# Patient Record
Sex: MALE | Race: BLACK OR AFRICAN AMERICAN | Employment: FULL TIME | URBAN - METROPOLITAN AREA
[De-identification: names, ages, dates, MRNs, and addresses within clinical notes are randomized per-mention and may not be internally consistent; named-entity substitution may affect disease eponyms.]

---

## 2019-01-17 ENCOUNTER — OFFICE VISIT (OUTPATIENT)
Dept: URGENT CARE | Facility: URGENT CARE | Age: 24
End: 2019-01-17

## 2019-01-17 ENCOUNTER — ANCILLARY PROCEDURE (OUTPATIENT)
Dept: GENERAL RADIOLOGY | Facility: CLINIC | Age: 24
End: 2019-01-17

## 2019-01-17 VITALS
SYSTOLIC BLOOD PRESSURE: 139 MMHG | TEMPERATURE: 99.1 F | BODY MASS INDEX: 28.44 KG/M2 | HEIGHT: 72 IN | OXYGEN SATURATION: 100 % | DIASTOLIC BLOOD PRESSURE: 81 MMHG | WEIGHT: 210 LBS | HEART RATE: 93 BPM

## 2019-01-17 DIAGNOSIS — S00.83XA FACIAL CONTUSION, INITIAL ENCOUNTER: Primary | ICD-10-CM

## 2019-01-17 DIAGNOSIS — S09.93XA FACIAL INJURY, INITIAL ENCOUNTER: ICD-10-CM

## 2019-01-17 PROCEDURE — 99203 OFFICE O/P NEW LOW 30 MIN: CPT | Performed by: PHYSICIAN ASSISTANT

## 2019-01-17 PROCEDURE — 70140 X-RAY EXAM OF FACIAL BONES: CPT

## 2019-01-17 ASSESSMENT — ENCOUNTER SYMPTOMS
NAUSEA: 0
FEVER: 0
VOMITING: 0
BLURRED VISION: 0
SHORTNESS OF BREATH: 0
ROS SKIN COMMENTS: BRUISING TO FACE
INSOMNIA: 1
DIZZINESS: 1
HEMATURIA: 0
HEADACHES: 0
NECK PAIN: 1

## 2019-01-17 ASSESSMENT — MIFFLIN-ST. JEOR: SCORE: 1985.55

## 2019-01-17 NOTE — LETTER
January 17, 2019      Vidal Huber  [address] [place] [address]        To Whom It May Concern:    Vidal Huber  was seen on 01/17/19. He may return to work tomorrow, 1/18/2019 without restriction.        Sincerely,        Rosenda Osman PA-C

## 2019-01-18 NOTE — PATIENT INSTRUCTIONS
We will call you with the radiologist's official report on your xrays. At this time, there were no broken bones seen.    Recommend ice over the next 48 hours- cover ice pack with a cloth- do not apply ice directly to skin.  Take ibuprofen for swelling and pain.    Monitor symptoms closely. If worsening in any way or new symptoms such as blurry vision, headache, dizziness, then go to the ER immediately. Otherwise, if no improvement in 3-5 days, follow up with your primary care provider.    You may return to work as scheduled.

## 2019-01-18 NOTE — PROGRESS NOTES
"SUBJECTIVE:   Vidal Huber is a 23 year old male presenting for evaluation of   Chief Complaint   Patient presents with     Urgent Care     Facial Injury     c/o face injury done yesterday while playing soccer   .  He was playing soccer last night while playing soccer around 10pm. He took an elbow to his left cheek and side of nose. He did not seek care at the time because \"everything was closed.\" He denies LOC. He says he \"felt a little dizzy initially\" but this is resolved. He slept poorly last night due to discomfort. No headache, blurry vision, hearing changes, tinnitus. No nausea, vomiting. Left side of neck is sore but he can still move his neck. Swelling was worse when he woke up this morning.  He applied ice on the wound. He has not taken any medication. No blood thinner use. No PMH serious head injuries.     Review of Systems   Constitutional: Positive for malaise/fatigue. Negative for fever.   HENT: Positive for congestion (has a little cold going on, started prior to head injury). Negative for tinnitus.    Eyes: Negative for blurred vision.   Respiratory: Negative for shortness of breath.    Cardiovascular: Negative for chest pain.   Gastrointestinal: Negative for nausea and vomiting.   Genitourinary: Negative for hematuria.   Musculoskeletal: Positive for neck pain.   Skin:        Bruising to face   Neurological: Positive for dizziness (immediately after incident, not persistent). Negative for headaches.   Psychiatric/Behavioral: The patient has insomnia.          PMH:  Past Medical History:   Diagnosis Date     NO ACTIVE PROBLEMS      There are no active problems to display for this patient.        Current medications:  No current outpatient medications on file.       Surgical History:  Past Surgical History:   Procedure Laterality Date     no prior surgeries         Family history:  History reviewed. No pertinent family history.      Social History:  Social History     Tobacco Use     Smoking " status: Never Smoker     Smokeless tobacco: Never Used   Substance Use Topics     Alcohol use: Not on file         OBJECTIVE:  /81   Pulse 93   Temp 99.1  F (37.3  C) (Tympanic)   Ht 1.829 m (6')   Wt 95.3 kg (210 lb)   SpO2 100%   BMI 28.48 kg/m      Physical Exam  General Appearance:  Alert, cooperative, no distress, appears stated age  Skin: Warm, dry. No ecchymosis. No rashes, lesions, or lacerations.  Head: Normocephalic. There is edema and ecchymosis of the left cheek and left side of nose. There is left infra-orbital edema.   Eyes: Conjunctiva clear, lids normal. PERRL. Left infraorbital edema. Extraocular eye motions fully intact and without pain bilaterally.  Ear, Nose, Throat: Face nontraumatic, moist mucus membranes. TMs normal bilaterally. No hemotympanum.   Lungs: No distress. Equal air entry to bases bilaterally. Lungs clear to ausculation bilaterally. No wheezes, rhonchi or stridor. Chest wall nontender.    Heart: Regular rate and rhythm, no murmur, rub or gallop. Strong, equal distal pulses.  Musculoskeletal:  Back: no Cspine tenderness or crepitus. Neck AROM flexion, extension, lateral flexion, lateral rotation intact. Gait: intact, normal. Normal strength.    Neuro: Alert & oriented appropriately. Normal tone. PERRL. Normal speech, no dysarthria. CN 2 full visual fields, 3/4/6 EOMI without nystagmus, 5 Sensory intact, 7 Motor intact, face symmetric, 8 Hearing intact, 9,10 11 normal strength, 12 Tongue midline.  Motor: moves all extremities equally.   No pronator drift. Sensory: intact distally. Gait: steady gait, no antalgia.  Psychomotor slowing (-). Abnormal Movements (-).Rapid alternating Movements intact. Heel-to-shin intact bilaterally.    Psych: Normal mood and affect            Labs:  Results for orders placed or performed in visit on 01/17/19 (from the past 24 hour(s))   XR Facial Bones 1/2 Views    Narrative    XR FACIAL BONES ONE-TWO VIEWS   1/17/2019 7:00 PM     INDICATION:  "Direct facial trauma, elbow to left maxillary area,  swelling and tenderness. Facial injury, initial encounter.    COMPARISON: None.      Impression    IMPRESSION: Mild soft tissue swelling over the bridge of the nose. No  definite acute fractures though facial CT would be more sensitive if  clinically indicated.    SHADI RESTREPO MD       Xray facial bones- my interpretation- I do not appreciate any fractures.      ASSESSMENT/PLAN:      ICD-10-CM    1. Facial contusion, initial encounter S00.83XA    2. Facial injury, initial encounter S09.93XA XR Facial Bones 1/2 Views     CANCELED: XR Nasal Bones 3 Views        Medical Decision Making:    Otherwise healthy 24yo male presenting for facial trauma which occurred 18 hours ago while playing soccer. He was elbowed in the left side of the face and nose. Patient was GCS 15 here and neurologically completely intact. There was no LOC and he denies any neurologic symptoms today. I have low suspicion for intracranial hemorrhage or clinically significant TBI or concussion. I do not think that he needs an emergent head CT today.  He does have facial trauma to the left zygomatic, nasal, and infra-orbital regions. There are no signs of extraocular eye muscle entrapment on exam. His left-sided infraorbital edema is most consistent with the direct blow to this area, rather than suggestive of a \"ahmadi sign\"/basilar skull fracture.   I did obtain facial bones xray looking for signs of fracture, as I have low suspicion. If high clinical suspicion, I would have sent him to the ED for emergent facial bone CT. However, I did not think this was necessary.  Facial bones xray was negative for fracture.  Discussed xray findings with patient. Recommend at this time he continue to treat with ice, ibuprofen. I did write a note for him to return to work tomorrow.  I discussed red flag symptoms with him which would warrant immediate evaluation in the ED including headache, blurry vision, " vomiting, severe pain.    At the end of the encounter, I discussed all available results with patient. Discussed diagnosis and treatment plan.   Return precautions provided to patient and printed below in patient instructions.  Patient understood and agreed to plan. Patient was appropriate for discharge.        Patient Instructions   We will call you with the radiologist's official report on your xrays. At this time, there were no broken bones seen.    Recommend ice over the next 48 hours- cover ice pack with a cloth- do not apply ice directly to skin.  Take ibuprofen for swelling and pain.    Monitor symptoms closely. If worsening in any way or new symptoms such as blurry vision, headache, dizziness, then go to the ER immediately. Otherwise, if no improvement in 3-5 days, follow up with your primary care provider.    You may return to work as scheduled.            Rosenda Osman PA-C  01/17/19 10:21 PM

## 2022-01-11 ENCOUNTER — OFFICE VISIT (OUTPATIENT)
Dept: URGENT CARE | Facility: URGENT CARE | Age: 27
End: 2022-01-11
Payer: OTHER GOVERNMENT

## 2022-01-11 VITALS
HEIGHT: 72 IN | OXYGEN SATURATION: 98 % | HEART RATE: 84 BPM | DIASTOLIC BLOOD PRESSURE: 82 MMHG | SYSTOLIC BLOOD PRESSURE: 132 MMHG | WEIGHT: 200 LBS | TEMPERATURE: 97.9 F | BODY MASS INDEX: 27.09 KG/M2 | RESPIRATION RATE: 16 BRPM

## 2022-01-11 DIAGNOSIS — Z20.822 SUSPECTED 2019 NOVEL CORONAVIRUS INFECTION: ICD-10-CM

## 2022-01-11 DIAGNOSIS — R53.83 FATIGUE, UNSPECIFIED TYPE: Primary | ICD-10-CM

## 2022-01-11 PROCEDURE — 99213 OFFICE O/P EST LOW 20 MIN: CPT | Performed by: NURSE PRACTITIONER

## 2022-01-11 PROCEDURE — U0003 INFECTIOUS AGENT DETECTION BY NUCLEIC ACID (DNA OR RNA); SEVERE ACUTE RESPIRATORY SYNDROME CORONAVIRUS 2 (SARS-COV-2) (CORONAVIRUS DISEASE [COVID-19]), AMPLIFIED PROBE TECHNIQUE, MAKING USE OF HIGH THROUGHPUT TECHNOLOGIES AS DESCRIBED BY CMS-2020-01-R: HCPCS | Performed by: NURSE PRACTITIONER

## 2022-01-11 PROCEDURE — U0005 INFEC AGEN DETEC AMPLI PROBE: HCPCS | Performed by: NURSE PRACTITIONER

## 2022-01-11 ASSESSMENT — MIFFLIN-ST. JEOR: SCORE: 1925.19

## 2022-01-11 NOTE — PROGRESS NOTES
Chief Complaint   Patient presents with     Urgent Care     Headache     headache, fatigue and body aches. Loss of taste and smell.      SUBJECTIVE:  Vidal Huber is a 26 year old male presenting with headache, fatigue, myalgias, loss of taste and smell for 3 days. He is not covid vaccinated and has not had known covid.    Past Medical History:   Diagnosis Date     NO ACTIVE PROBLEMS      No current outpatient medications on file prior to visit.  No current facility-administered medications on file prior to visit.    Social History     Tobacco Use     Smoking status: Never Smoker     Smokeless tobacco: Never Used   Substance Use Topics     Alcohol use: Not on file     No Known Allergies    Review of Systems   All systems negative except for those listed above in HPI.    OBJECTIVE:   /82   Pulse 84   Temp 97.9  F (36.6  C) (Temporal)   Resp 16   Ht 1.829 m (6')   Wt 90.7 kg (200 lb)   SpO2 98%   BMI 27.12 kg/m       Physical Exam  Vitals reviewed.   Constitutional:       Appearance: Normal appearance.   HENT:      Head: Normocephalic and atraumatic.      Nose: Nose normal.      Mouth/Throat:      Mouth: Mucous membranes are moist.      Pharynx: Oropharynx is clear.   Eyes:      Extraocular Movements: Extraocular movements intact.      Conjunctiva/sclera: Conjunctivae normal.      Pupils: Pupils are equal, round, and reactive to light.   Cardiovascular:      Rate and Rhythm: Normal rate.      Pulses: Normal pulses.   Pulmonary:      Effort: Pulmonary effort is normal. No respiratory distress.      Breath sounds: Normal breath sounds. No stridor. No wheezing, rhonchi or rales.   Chest:      Chest wall: No tenderness.   Musculoskeletal:         General: Normal range of motion.      Cervical back: Normal range of motion and neck supple.   Skin:     General: Skin is warm and dry.      Findings: No rash.   Neurological:      General: No focal deficit present.      Mental Status: He is alert and oriented to  person, place, and time.   Psychiatric:         Mood and Affect: Mood normal.         Behavior: Behavior normal.       ASSESSMENT:    ICD-10-CM    1. Fatigue  R53.83 Symptomatic; Unknown COVID-19 Virus (Coronavirus) by PCR Nose   2. Suspected 2019 novel coronavirus infection  Z20.822      PLAN:     COVID pending, we call for positives in 1-2 days  May return to work if negative and fever free for 24 hours  Rest! Your body needs more rest to heal.  Drink plenty of fluids (warm fluids like tea or soup are soothing and reduce cough)  Sit in the bathroom with a hot shower running and breathe in the steam.  Honey may soothe your sore throat and help manage your cough- may take straight or in warm water with lemon juice.  Avoid smoke (cigarettes, bonfires, fireplace, wood burning stoves).  Take Tylenol or an NSAID such as ibuprofen or naproxen as needed for pain.  Delsym (dextromethorphan polistirex) is an over the counter cough medication that lasts 12 hours.   Mucinex or Robitussin (guiafenesin) thin mucus and may help it to loosen more quickly  Good handwashing is the best way to prevent spread of germs  Present to emergency room if you develop trouble breathing, swallowing or cough-up blood.  Follow up with your primary care provider if symptoms worsen or fail to improve as expected.    Follow up with primary care provider with any problems, questions or concerns or if symptoms worsen or fail to improve. Patient agreed to plan and verbalized understanding.    Claudia Acevedo, MARIANA-BC  [facility]

## 2022-01-11 NOTE — LETTER
[facility] [place]  [address] [place] [address]  Phone: [phone]        2022    Vidal Huber  [address]   [place] [address]  [phone] (home)     :     1995      To Whom it May Concern:    This patient was seen in clinic for acute illness with symptom onset of 22. Please excuse medical related absences. May return to work if negative covid results and fever free for 24 hours.    Please contact me for questions or concerns.    Sincerely,    Claudia Acevedo, NP

## 2022-01-12 ENCOUNTER — NURSE TRIAGE (OUTPATIENT)
Dept: NURSING | Facility: CLINIC | Age: 27
End: 2022-01-12

## 2022-01-12 LAB — SARS-COV-2 RNA RESP QL NAA+PROBE: POSITIVE

## 2022-01-12 NOTE — TELEPHONE ENCOUNTER
Coronavirus (COVID-19) Notification     Reason for call  Patient requesting results     Lab Result    Lab test 2019-nCoV rRt-PCR in process        RN Recommendations/Instructions per [facility]  Continue to quarantine and follow the instructions given at your testing visit until you receive the results.     Please Contact your PCP clinic or return to the Emergency department if your:    Symptoms worsen or other concerning symptom's.     Patient informed that if test for COVID19 is POSITIVE,  you will receive a call typically within 48 hours from the test date (date lab collected).  If NEGATIVE result, you will receive a letter in the mail or MyChart.      Mojgan Saba RN

## 2022-01-13 ENCOUNTER — TELEPHONE (OUTPATIENT)
Dept: NURSING | Facility: CLINIC | Age: 27
End: 2022-01-13

## 2022-01-13 NOTE — TELEPHONE ENCOUNTER
"Coronavirus (COVID-19) Notification    Caller Name (Patient, parent, daughter/son, grandparent, etc)  patient    Reason for call  Notify of Positive Coronavirus (COVID-19) lab results, assess symptoms,  review  [facility] recommendations    Lab Result    Lab test:  2019-nCoV rRt-PCR or SARS-CoV-2 PCR    Oropharyngeal AND/OR nasopharyngeal swabs is POSITIVE for 2019-nCoV RNA/SARS-COV-2 PCR (COVID-19 virus)    RN Recommendations/Instructions per [facility] Coronavirus COVID-19 recommendations    Brief introduction script  Introduce self then review script:  \"I am calling on behalf of [employer].  We were notified that your Coronavirus test (COVID-19) for was POSITIVE for the virus.  I have some information to relay to you but first I wanted to mention that the [place] Dept of Health will be contacting you shortly [it's possible MD already called Patient] to talk to you more about how you are feeling and other people you have had contact with who might now also have the virus.  Also,  [facility] is Partnering with the [facility] for Covid-19 research, you may be contacted directly by research staff.\"    Assessment (Inquire about Patient's current symptoms)   Assessment   Current Symptoms at time of phone call: (if no symptoms, document No symptoms] Tired    Symptoms onset (if applicable) 1/8/2022     If at time of call, Patients symptoms hare worsened, the Patient should contact 911 or have someone drive them to Emergency Dept promptly:      If Patient calling 911, inform 911 personal that you have tested positive for the Coronavirus (COVID-19).  Place mask on and await 911 to arrive.    If Emergency Dept, If possible, please have another adult drive you to the Emergency Dept but you need to wear mask when in contact with other people.      Monoclonal Antibody Administration    You may be eligible to receive a new treatment with a monoclonal antibody for preventing hospitalization in patients " at high risk for complications from COVID-19.   This medication is still experimental and available on a limited basis; it is given through an IV and must be given at an infusion center. Please note that not all people who are eligible will receive the medication since it is in limited supply.     Are you interested in being considered for this medication?  No.   Does the patient fit the criteria: No    Review information with Patient    Your result was positive. This means you have COVID-19 (coronavirus).  We have sent you a letter that reviews the information that I'll be reviewing with you now.    How can I protect others?    If you have symptoms: stay home and away from others (self-isolate) until:    You've had no fever--and no medicine that reduces fever--for 1 full day (24 hours). And       Your other symptoms have gotten better. For example, your cough or breathing has improved. And     At least 10 days have passed since your symptoms started. (If you've been told by a doctor that you have a weak immune system, wait 20 days.)     If you don't have symptoms: Stay home and away from others (self-isolate) until at least 10 days have passed since your first positive COVID-19 test. (Date test collected)    During this time:    Stay in your own room, including for meals. Use your own bathroom if you can.    Stay away from others in your home. No hugging, kissing or shaking hands. No visitors.     Don't go to work, school or anywhere else.     Clean  high touch  surfaces often (doorknobs, counters, handles, etc.). Use a household cleaning spray or wipes. You'll find a full list on the EPA website at www.epa.gov/pesticide-registration/list-n-disinfectants-use-against-sars-cov-2.     Cover your mouth and nose with a mask, tissue or other face covering to avoid spreading germs.    Wash your hands and face often with soap and water.    Make a list of people you have been in close contact with recently, even if either  of you wore a face covering.   - Start your list from 2 days before you became ill or had a positive test.  - Include anyone that was within 6 feet of you for a cumulative total of 15 minutes or more in 24 hours. (Example: if you sat next to Sg for 5 minutes in the morning and 10 minutes in the afternoon, then you were in close contact for 15 minutes total that day. Sg would be added to your list.)    A public health worker will call or text you. It is important that you answer. They will ask you questions about possible exposures to COVID-19, such as people you have been in direct contact with and places you have visited.    Tell the people on your list that you have COVID-19; they should stay away from others for 14 days starting from the last time they were in contact with you (unless you are told something different from a public health worker).     Caregivers in these groups are at risk for severe illness due to COVID-19:  o People 65 years and older  o People who live in a nursing home or long-term care facility  o People with chronic disease (lung, heart, cancer, diabetes, kidney, liver, immunologic)  o People who have a weakened immune system, including those who:  - Are in cancer treatment  - Take medicine that weakens the immune system, such as corticosteroids  - Had a bone marrow or organ transplant  - Have an immune deficiency  - Have poorly controlled HIV or AIDS  - Are obese (body mass index of 40 or higher)  - Smoke regularly    Caregivers should wear gloves while washing dishes, handling laundry and cleaning bedrooms and bathrooms.    Wash and dry laundry with special caution. Don't shake dirty laundry, and use the warmest water setting you can.    If you have a weakened immune system, ask your doctor about other actions you should take.    For more tips, go to www.cdc.gov/coronavirus/2019-ncov/downloads/10Things.pdf.    You should not go back to work until you meet the guidelines above for  ending your home isolation. You don't need to be retested for COVID-19 before going back to work--studies show that you won't spread the virus if it's been at least 10 days since your symptoms started (or 20 days, if you have a weak immune system).    Employers: This document serves as formal notice of your employee's medical guidelines for going back to work. They must meet the above guidelines before going back to work in person.    How can I take care of myself?    1. Get lots of rest. Drink extra fluids (unless a doctor has told you not to).    2. Take Tylenol (acetaminophen) for fever or pain. If you have liver or kidney problems, ask your family doctor if it's okay to take Tylenol.     Take either:     650 mg (two 325 mg pills) every 4 to 6 hours, or     1,000 mg (two 500 mg pills) every 8 hours as needed.     Note: Don't take more than 3,000 mg in one day. Acetaminophen is found in many medicines (both prescribed and over-the-counter medicines). Read all labels to be sure you don't take too much.    For children, check the Tylenol bottle for the right dose (based on their age or weight).    3. If you have other health problems (like cancer, heart failure, an organ transplant or severe kidney disease): Call your specialty clinic if you don't feel better in the next 2 days.    4. Know when to call 911: Emergency warning signs include:    Trouble breathing or shortness of breath    Pain or pressure in the chest that doesn't go away    Feeling confused like you haven't felt before, or not being able to wake up    Bluish-colored lips or face    5. Sign up for [employer]. We know it's scary to hear that you have COVID-19. We want to track your symptoms to make sure you're okay over the next 2 weeks. Please look for an email from [employer]--this is a free, online program that we'll use to keep in touch. To sign up, follow the link in the email. Learn more at [email or web address].    Where can I get more  information?    [facility]: [email or web address]    Coronavirus Basics: www.health.[email or web address].[place]./diseases/coronavirus/basics.html    What to Do If You're Sick: www.cdc.gov/coronavirus/2019-ncov/about/steps-when-sick.html    Ending Home Isolation: www.cdc.gov/coronavirus/2019-ncov/hcp/disposition-in-home-patients.html     Caring for Someone with COVID-19: www.cdc.gov/coronavirus/2019-ncov/if-you-are-sick/care-for-someone.html     [facility] clinical trials (COVID-19 research studies): clinicalaffairs.[facility]-clinical-trials     Christine Wu LPN

## 2022-01-13 NOTE — TELEPHONE ENCOUNTER
Coronavirus (COVID-19) Notification    Reason for call  Notify of POSITIVE  COVID-19 lab result, assess symptoms,  review [facility] recommendations    Lab Result   Lab test for 2019-nCoV rRt-PCR or SARS-COV-2 PCR  Oropharyngeal AND/OR nasopharyngeal swabs were POSITIVE for 2019-nCoV RNA [OR] SARS-COV-2 RNA (COVID-19) RNA     We have been unable to reach Patient by phone at this time to notify of their Positive COVID-19 result.  Left voicemail message requesting a call back to [phone] [facility] for results.        POSITIVE COVID-19 Letter sent.    Deepa Noriega RN